# Patient Record
(demographics unavailable — no encounter records)

---

## 2025-04-01 NOTE — HISTORY OF PRESENT ILLNESS
[de-identified] : Patient presents today in office to follow up on the RT knee. RT TKA 4/15/24. Patient reports that overall her right knee has been feeling good, continues to do HEP. Ambulating with cane. Patient reports use of Tylenol PRN.

## 2025-04-01 NOTE — DISCUSSION/SUMMARY
[de-identified] : Patient continues to progress well at this time. Patient demonstrates continued improvement and maintenance of both strength and range of motion. Patient expresses that they are feeling very well at this time, and reports being very pleased with the outcome at this time. Reviewed x-rays with patient, demonstrating well fixed components. Discussed importance of non-impact exercise and muscle stretching before and after exercise. Stretching exercises shown and demonstrated in office for patient to continue with daily. Explained the importance of ice and rest. Continue home strengthening and stretching program consisting of non-impact exercises and ice as needed.   Patient was instructed at this time that they are to follow up every 2-3 years for continued evaluation, or sooner if any problems or concerns should arise. Patient expressed understanding of this.

## 2025-04-01 NOTE — PHYSICAL EXAM
[NL (0)] : extension 0 degrees [5___] : hamstring 5[unfilled]/5 [Right] : right knee [AP] : anteroposterior [Lateral] : lateral [[place]] : skyline [] : mildly antalgic [FreeTextEntry9] : Well-aligned, well-fixed prosthesis. No lucency noted. Patella centered.  [TWNoteComboBox7] : flexion 125 degrees
